# Patient Record
Sex: MALE | Race: BLACK OR AFRICAN AMERICAN | NOT HISPANIC OR LATINO | Employment: OTHER | URBAN - NONMETROPOLITAN AREA
[De-identification: names, ages, dates, MRNs, and addresses within clinical notes are randomized per-mention and may not be internally consistent; named-entity substitution may affect disease eponyms.]

---

## 2018-02-15 ENCOUNTER — TELEPHONE (OUTPATIENT)
Dept: SURGERY | Facility: CLINIC | Age: 58
End: 2018-02-15

## 2018-02-26 ENCOUNTER — OFFICE VISIT (OUTPATIENT)
Dept: SURGERY | Facility: CLINIC | Age: 58
End: 2018-02-26

## 2018-02-26 VITALS
RESPIRATION RATE: 16 BRPM | BODY MASS INDEX: 27.85 KG/M2 | WEIGHT: 224 LBS | DIASTOLIC BLOOD PRESSURE: 74 MMHG | HEIGHT: 75 IN | HEART RATE: 72 BPM | SYSTOLIC BLOOD PRESSURE: 118 MMHG

## 2018-02-26 DIAGNOSIS — Z80.0 FAMILY HISTORY OF COLON CANCER: ICD-10-CM

## 2018-02-26 DIAGNOSIS — R19.7 DIARRHEA, UNSPECIFIED TYPE: Primary | ICD-10-CM

## 2018-02-26 DIAGNOSIS — Z12.11 COLON CANCER SCREENING: ICD-10-CM

## 2018-02-26 PROCEDURE — 99203 OFFICE O/P NEW LOW 30 MIN: CPT | Performed by: SURGERY

## 2018-02-26 NOTE — H&P
"Frank Altamirano 57 y.o. male presents to discuss c-scope.  Last c-scope 5 yrs ago, + H/O polyps.       HPI   Above noted and agree.  Frank is here for a colonoscopy.  He has a family history of colon cancer.  His last colonoscopy was negative.  He is doing well.  He is tolerating a regular diet without nausea or vomiting.  He does have some issues with diarrhea.  That is new.  He moves his bowels 3-4 times per day and it is liquid.  He does not see blood when he goes.  He has no abdominal pain.  He has no chest pain.  He is otherwise without complaints.      Review of Systems   All other systems reviewed and are negative.          Past Medical History:   Diagnosis Date   • Diabetes    • GERD (gastroesophageal reflux disease)    • Hyperlipidemia    • Hypertension    • Insomnia    • Osteoarthritis    • Schizophrenia            Past Surgical History:   Procedure Laterality Date   • COLONOSCOPY             Physical Exam   Constitutional: He is oriented to person, place, and time. He appears well-developed and well-nourished.   HENT:   Head: Normocephalic and atraumatic.   Cardiovascular: Normal rate and regular rhythm.    Pulmonary/Chest: Effort normal and breath sounds normal.   Abdominal: Soft. Bowel sounds are normal.   Musculoskeletal: He exhibits no edema.   Neurological: He is alert and oriented to person, place, and time.   Skin: Skin is warm and dry.   Psychiatric: He has a normal mood and affect. His behavior is normal.   Nursing note and vitals reviewed.          /74  Pulse 72  Resp 16  Ht 190.5 cm (75\")  Wt 102 kg (224 lb)  BMI 28 kg/m2        Frank was seen today for colonoscopy.    Diagnoses and all orders for this visit:    Diarrhea, unspecified type    Family history of colon cancer    Colon cancer screening    We will get Frank scheduled for a colonoscopy.  I discussed with the patient the benefits and risks of performing endoscopy.  Benefits and risks were not limited to but including " bleeding, infection, perforation, complications of anesthesia, aspiration.  The patient appeared to understand and is willing to proceed.    Thank you for allowing me to participate in the care of this interesting patient.

## 2018-03-07 ENCOUNTER — ANESTHESIA EVENT (OUTPATIENT)
Dept: PERIOP | Facility: HOSPITAL | Age: 58
End: 2018-03-07

## 2018-03-08 ENCOUNTER — ANESTHESIA (OUTPATIENT)
Dept: PERIOP | Facility: HOSPITAL | Age: 58
End: 2018-03-08

## 2018-03-08 ENCOUNTER — HOSPITAL ENCOUNTER (OUTPATIENT)
Facility: HOSPITAL | Age: 58
Setting detail: HOSPITAL OUTPATIENT SURGERY
Discharge: HOME | End: 2018-03-08
Attending: SURGERY | Admitting: SURGERY

## 2018-03-08 VITALS
OXYGEN SATURATION: 97 % | DIASTOLIC BLOOD PRESSURE: 97 MMHG | RESPIRATION RATE: 14 BRPM | BODY MASS INDEX: 27.73 KG/M2 | WEIGHT: 223 LBS | SYSTOLIC BLOOD PRESSURE: 139 MMHG | HEIGHT: 75 IN | TEMPERATURE: 98.5 F | HEART RATE: 71 BPM

## 2018-03-08 DIAGNOSIS — R19.7 DIARRHEA, UNSPECIFIED TYPE: ICD-10-CM

## 2018-03-08 DIAGNOSIS — Z80.0 FAMILY HISTORY OF COLON CANCER: ICD-10-CM

## 2018-03-08 DIAGNOSIS — Z12.11 COLON CANCER SCREENING: ICD-10-CM

## 2018-03-08 LAB
GLUCOSE BLDC GLUCOMTR-MCNC: 114 MG/DL (ref 70–130)
GLUCOSE BLDC GLUCOMTR-MCNC: 157 MG/DL (ref 70–130)
POTASSIUM BLD-SCNC: 3.9 MMOL/L (ref 3.5–5.2)

## 2018-03-08 PROCEDURE — 93010 ELECTROCARDIOGRAM REPORT: CPT | Performed by: INTERNAL MEDICINE

## 2018-03-08 PROCEDURE — 82962 GLUCOSE BLOOD TEST: CPT

## 2018-03-08 PROCEDURE — 45380 COLONOSCOPY AND BIOPSY: CPT | Performed by: SURGERY

## 2018-03-08 PROCEDURE — 93005 ELECTROCARDIOGRAM TRACING: CPT | Performed by: NURSE ANESTHETIST, CERTIFIED REGISTERED

## 2018-03-08 PROCEDURE — 84132 ASSAY OF SERUM POTASSIUM: CPT | Performed by: NURSE ANESTHETIST, CERTIFIED REGISTERED

## 2018-03-08 PROCEDURE — 25010000002 PROPOFOL 10 MG/ML EMULSION: Performed by: NURSE ANESTHETIST, CERTIFIED REGISTERED

## 2018-03-08 RX ORDER — SODIUM CHLORIDE, SODIUM LACTATE, POTASSIUM CHLORIDE, CALCIUM CHLORIDE 600; 310; 30; 20 MG/100ML; MG/100ML; MG/100ML; MG/100ML
9 INJECTION, SOLUTION INTRAVENOUS CONTINUOUS
Status: DISCONTINUED | OUTPATIENT
Start: 2018-03-08 | End: 2018-03-08 | Stop reason: HOSPADM

## 2018-03-08 RX ORDER — LIDOCAINE HYDROCHLORIDE 20 MG/ML
INJECTION, SOLUTION INFILTRATION; PERINEURAL AS NEEDED
Status: DISCONTINUED | OUTPATIENT
Start: 2018-03-08 | End: 2018-03-08 | Stop reason: SURG

## 2018-03-08 RX ORDER — LIDOCAINE HYDROCHLORIDE 10 MG/ML
0.5 INJECTION, SOLUTION EPIDURAL; INFILTRATION; INTRACAUDAL; PERINEURAL ONCE AS NEEDED
Status: COMPLETED | OUTPATIENT
Start: 2018-03-08 | End: 2018-03-08

## 2018-03-08 RX ORDER — SODIUM CHLORIDE 9 MG/ML
40 INJECTION, SOLUTION INTRAVENOUS AS NEEDED
Status: DISCONTINUED | OUTPATIENT
Start: 2018-03-08 | End: 2018-03-08 | Stop reason: HOSPADM

## 2018-03-08 RX ORDER — SODIUM CHLORIDE 0.9 % (FLUSH) 0.9 %
1-10 SYRINGE (ML) INJECTION AS NEEDED
Status: DISCONTINUED | OUTPATIENT
Start: 2018-03-08 | End: 2018-03-08 | Stop reason: HOSPADM

## 2018-03-08 RX ORDER — PROPOFOL 10 MG/ML
VIAL (ML) INTRAVENOUS AS NEEDED
Status: DISCONTINUED | OUTPATIENT
Start: 2018-03-08 | End: 2018-03-08 | Stop reason: SURG

## 2018-03-08 RX ORDER — BISACODYL 5 MG/1
5 TABLET, DELAYED RELEASE ORAL DAILY PRN
COMMUNITY

## 2018-03-08 RX ADMIN — LIDOCAINE HYDROCHLORIDE 0.2 ML: 10 INJECTION, SOLUTION EPIDURAL; INFILTRATION; INTRACAUDAL; PERINEURAL at 10:30

## 2018-03-08 RX ADMIN — PROPOFOL 50 MG: 10 INJECTION, EMULSION INTRAVENOUS at 11:45

## 2018-03-08 RX ADMIN — PROPOFOL 50 MG: 10 INJECTION, EMULSION INTRAVENOUS at 11:38

## 2018-03-08 RX ADMIN — LIDOCAINE HYDROCHLORIDE 100 MG: 20 INJECTION, SOLUTION INFILTRATION; PERINEURAL at 11:37

## 2018-03-08 RX ADMIN — PROPOFOL 50 MG: 10 INJECTION, EMULSION INTRAVENOUS at 11:39

## 2018-03-08 RX ADMIN — PROPOFOL 50 MG: 10 INJECTION, EMULSION INTRAVENOUS at 11:41

## 2018-03-08 RX ADMIN — PROPOFOL 50 MG: 10 INJECTION, EMULSION INTRAVENOUS at 11:52

## 2018-03-08 RX ADMIN — SODIUM CHLORIDE, POTASSIUM CHLORIDE, SODIUM LACTATE AND CALCIUM CHLORIDE 9 ML/HR: 600; 310; 30; 20 INJECTION, SOLUTION INTRAVENOUS at 10:30

## 2018-03-08 RX ADMIN — SODIUM CHLORIDE, POTASSIUM CHLORIDE, SODIUM LACTATE AND CALCIUM CHLORIDE: 600; 310; 30; 20 INJECTION, SOLUTION INTRAVENOUS at 11:15

## 2018-03-08 NOTE — PLAN OF CARE
Problem: GI Endoscopy (Adult)  Goal: Signs and Symptoms of Listed Potential Problems Will be Absent or Manageable (GI Endoscopy)  Outcome: Ongoing (interventions implemented as appropriate)   03/08/18 1139   GI Endoscopy   Problems Assessed (GI Endoscopy) all   Problems Present (GI Endoscopy) none

## 2018-03-08 NOTE — NURSING NOTE
Patient instructions provided to jose Quintero from group home.  Transportation for patient delayed and dining voucher provided for patient and caregiver.  Patient accompanied to dining wolf by this RN and group home caregiver Ingrid.

## 2018-03-08 NOTE — PLAN OF CARE
Problem: Patient Care Overview (Adult)  Goal: Plan of Care Review  Outcome: Ongoing (interventions implemented as appropriate)   03/08/18 1052   Coping/Psychosocial Response Interventions   Plan Of Care Reviewed With patient   Outcome Evaluation   Outcome Summary/Follow up Plan vss. no c/o. ready for procedure.

## 2018-03-08 NOTE — PLAN OF CARE
Problem: GI Endoscopy (Adult)  Goal: Signs and Symptoms of Listed Potential Problems Will be Absent or Manageable (GI Endoscopy)  Outcome: Ongoing (interventions implemented as appropriate)   03/08/18 1053   GI Endoscopy   Problems Assessed (GI Endoscopy) all      03/08/18 1053   GI Endoscopy   Problems Assessed (GI Endoscopy) all

## 2018-03-08 NOTE — PLAN OF CARE
Problem: GI Endoscopy (Adult)  Goal: Signs and Symptoms of Listed Potential Problems Will be Absent or Manageable (GI Endoscopy)  Outcome: Outcome(s) achieved Date Met: 03/08/18      Problem: Patient Care Overview (Adult)  Goal: Plan of Care Review  Outcome: Outcome(s) achieved Date Met: 03/08/18    Goal: Adult Individualization and Mutuality  Outcome: Outcome(s) achieved Date Met: 03/08/18

## 2018-03-08 NOTE — ANESTHESIA POSTPROCEDURE EVALUATION
Patient: Frank Altamirano    Procedure Summary     Date Anesthesia Start Anesthesia Stop Room / Location    03/08/18 1135 1203 [facility] / [facility]       Procedure Diagnosis Surgeon Provider    COLONOSCOPY, polypectomy  (N/A ) Family history of colon cancer; Colon cancer screening; Diarrhea, unspecified type  (Family history of colon cancer [Z80.0]; Colon cancer screening [Z12.11]; Diarrhea, unspecified type [R19.7]) DO Janak Markham CRNA          Anesthesia Type: MAC  Last vitals  BP   139/74 (03/08/18 1220)   Temp   98.5 °F (36.9 °C) (03/08/18 1023)   Pulse   64 (03/08/18 1220)   Resp   14 (03/08/18 1220)     SpO2   97 % (03/08/18 1220)     Post Anesthesia Care and Evaluation    Patient participation: complete - patient participated  Level of consciousness: awake and alert  Pain management: adequate  Airway patency: patent  Anesthetic complications: No anesthetic complications  PONV Status: none  Cardiovascular status: acceptable  Respiratory status: acceptable  Hydration status: acceptable    Comments: Post op BS = 157

## 2018-03-08 NOTE — ANESTHESIA PREPROCEDURE EVALUATION
Anesthesia Evaluation     Patient summary reviewed   no history of anesthetic complications:  NPO Solid Status: > 8 hours  NPO Liquid Status: > 2 hours           Airway   Mallampati: II  TM distance: >3 FB  Neck ROM: full  No difficulty expected  Dental          Pulmonary - normal exam    breath sounds clear to auscultation  (+) a smoker ( 1/2ppd x 40 years) Current Abstained day of surgery,   Cardiovascular - normal exam  Exercise tolerance: good (4-7 METS)    PT is on anticoagulation therapy  Rhythm: regular  Rate: normal    (+) hypertension, valvular problems/murmurs, hyperlipidemia,       Neuro/Psych  (+) psychiatric history ( paranoid schizophrenia per resident [facility] papers) Schizophrenia and Depression,     GI/Hepatic/Renal/Endo    (+)  GERD,  diabetes mellitus well controlled,     Musculoskeletal     (+) back pain, chronic pain, neck pain, neck stiffness,   Abdominal    Substance History - negative use     OB/GYN negative ob/gyn ROS         Other   (+) arthritis     ROS/Med Hx Other: 0800:  Black coffee and clear broth    Last asa was 3/2/18      Phys Exam Other: Multiple missing teeth top and bottom              Anesthesia Plan    ASA 2     MAC     intravenous induction   Anesthetic plan and risks discussed with patient.  Use of blood products discussed with patient  Consented to blood products.

## 2018-03-08 NOTE — OP NOTE
Colonoscopy Procedure Note      Pre-operative Diagnosis:  Family history of colon cancer [Z80.0]  Colon cancer screening [Z12.11]  Diarrhea, unspecified type [R19.7]    Post-operative Diagnosis: left colon polyp, rectal polyp    Procedure: Colonoscopy with polypectomy with cold forceps     Surgeon: Cecile    Anesthetic: MAC     Estimated Blood Loss:  minimal    Complications:  none    Indications:  See preop diagnosis    Findings/Treatments:   polpys--removed with cold forceps       Scope Withdrawal Time:  > 6 minutes      Recommendations:  Await pathology      Procedure Details     After discussing the benefits and risks of the procedure with the patient, not limited to but including:  Bleeding, infection, perforation, aspiration; informed consent was signed.  The patient was taken into the endoscopy room at [facility] and placed in the left lateral decubitus position.  MAC anesthesia was given with appropriate cardiopulmonary monitoring.  A rectal exam was performed.  Sphincter tone was normal.  The colonoscope was then inserted and carefully advanced to the cecum while visualizing the mucosa.  The cecum was identified by the ileocecal valve and the orifice of the appendix.  Once in the cecum, the scope was withdrawn while evaluating the mucosa and deflating air.  In the left colon a polyp was removed with cold forceps.  The scope was then withdrawn while evaluating the mucosa and deflating air.  In the rectum, another small polyp was removed with cold forceps.  The scope was then removed and Frank was taken to the recovery area in stable condition having tolerated his procedure well.    Yana Huber DO

## 2018-03-14 LAB
LAB AP CASE REPORT: NORMAL
Lab: NORMAL
PATH REPORT.FINAL DX SPEC: NORMAL

## 2018-03-16 ENCOUNTER — OFFICE VISIT (OUTPATIENT)
Dept: SURGERY | Facility: CLINIC | Age: 58
End: 2018-03-16

## 2018-03-16 DIAGNOSIS — K62.1 RECTAL POLYP: Primary | ICD-10-CM

## 2018-03-16 PROCEDURE — 99212 OFFICE O/P EST SF 10 MIN: CPT | Performed by: SURGERY

## 2018-03-16 NOTE — PROGRESS NOTES
Frank Altamirano 57 y.o. male presents for PO FU C-SCOPE.        HPI     Frank is doing well.  He had a hyperplastic rectal polyp and a benign colon polyp.  He is doing well and has no complaints.    Review of Systems        Past Medical History:   Diagnosis Date   • Diabetes    • GERD (gastroesophageal reflux disease)    • Hyperlipidemia    • Hypertension    • Insomnia    • Osteoarthritis    • Schizophrenia            Past Surgical History:   Procedure Laterality Date   • COLONOSCOPY     • COLONOSCOPY N/A 3/8/2018    Procedure: COLONOSCOPY, polypectomy ;  Surgeon: Yana Huber DO;  Location: [facility];  Service:            Physical Exam   Constitutional: He is oriented to person, place, and time. He appears well-developed and well-nourished.   Cardiovascular: Normal rate and regular rhythm.    Pulmonary/Chest: Effort normal and breath sounds normal.   Abdominal: Soft. Bowel sounds are normal.   Musculoskeletal: He exhibits no edema.   Neurological: He is alert and oriented to person, place, and time.   Psychiatric: He has a normal mood and affect. His behavior is normal.   Nursing note reviewed.          There were no vitals taken for this visit.        Frank was seen today for post-op follow-up.    Diagnoses and all orders for this visit:    Rectal polyp    We have added Frank to our 5 year repeat colonoscopy list.  He may return anytime sooner as needed.    Thank you for allowing me to participate in the care of this interesting patient.

## 2023-02-20 ENCOUNTER — TELEPHONE (OUTPATIENT)
Dept: SURGERY | Facility: CLINIC | Age: 63
End: 2023-02-20
Payer: MEDICARE

## 2023-03-27 ENCOUNTER — OFFICE VISIT (OUTPATIENT)
Dept: SURGERY | Facility: CLINIC | Age: 63
End: 2023-03-27
Payer: MEDICARE

## 2023-03-27 VITALS
WEIGHT: 206 LBS | RESPIRATION RATE: 16 BRPM | DIASTOLIC BLOOD PRESSURE: 72 MMHG | SYSTOLIC BLOOD PRESSURE: 112 MMHG | HEART RATE: 94 BPM | HEIGHT: 75 IN | BODY MASS INDEX: 25.61 KG/M2

## 2023-03-27 DIAGNOSIS — Z80.0 FAMILY HISTORY OF COLON CANCER: ICD-10-CM

## 2023-03-27 DIAGNOSIS — Z72.0 TOBACCO USE: ICD-10-CM

## 2023-03-27 DIAGNOSIS — Z86.010 HISTORY OF COLON POLYPS: Primary | ICD-10-CM

## 2023-03-27 PROBLEM — Z12.11 COLON CANCER SCREENING: Status: RESOLVED | Noted: 2018-02-26 | Resolved: 2023-03-27

## 2023-03-27 PROBLEM — R19.7 DIARRHEA: Status: RESOLVED | Noted: 2018-02-26 | Resolved: 2023-03-27

## 2023-03-27 PROCEDURE — S0260 H&P FOR SURGERY: HCPCS | Performed by: SURGERY

## 2023-03-27 PROCEDURE — 1159F MED LIST DOCD IN RCRD: CPT | Performed by: SURGERY

## 2023-03-27 PROCEDURE — 1160F RVW MEDS BY RX/DR IN RCRD: CPT | Performed by: SURGERY

## 2023-03-27 NOTE — PROGRESS NOTES
Frank Altamirano 63 y.o. male presents @ the req of [facility] Klein for eval of c-scope.  + personal H/O colon polyps.   Chief Complaint   Patient presents with   • Colonoscopy             HPI   Frank is here to discuss a 5-year repeat colonoscopy.  He is tolerating a regular diet without nausea or vomiting.  He is moving his bowels without difficulty.  He has lost about 20 pounds in the last visit.  He denies chest pain or shortness of breath.  He has no abdominal pain.  He does smoke cigarettes.  He does not chew tobacco.  He has no complaints.      Review of Systems   All other systems reviewed and are negative.            Current Outpatient Medications:   •  acetaminophen (TYLENOL) 325 MG tablet, Take 650 mg by mouth Every 6 (Six) Hours As Needed for Mild Pain ., Disp: , Rfl:   •  aspirin 81 MG chewable tablet, Chew 81 mg Daily., Disp: , Rfl:   •  benztropine (COGENTIN) 1 MG tablet, Take 1 mg by mouth 2 (Two) Times a Day., Disp: , Rfl:   •  bisacodyl (DULCOLAX) 5 MG EC tablet, Take 5 mg by mouth Daily As Needed for Constipation., Disp: , Rfl:   •  Canagliflozin (INVOKANA) 300 MG tablet, Take 300 mg by mouth., Disp: , Rfl:   •  diclofenac (VOLTAREN) 1 % gel gel, Apply 4 g topically 4 (Four) Times a Day As Needed., Disp: , Rfl:   •  docusate sodium (COLACE) 250 MG capsule, Take 250 mg by mouth Daily., Disp: , Rfl:   •  hydrochlorothiazide (HYDRODIURIL) 25 MG tablet, Take 25 mg by mouth Daily., Disp: , Rfl:   •  ibuprofen (ADVIL,MOTRIN) 600 MG tablet, Take 600 mg by mouth Every 6 (Six) Hours As Needed for Mild Pain ., Disp: , Rfl:   •  Insulin Glargine (BASAGLAR KWIKPEN SC), Inject  under the skin., Disp: , Rfl:   •  linagliptin (TRADJENTA) 5 MG tablet tablet, Take 5 mg by mouth Daily., Disp: , Rfl:   •  lisinopril (PRINIVIL,ZESTRIL) 5 MG tablet, Take 5 mg by mouth Daily., Disp: , Rfl:   •  Loratadine 10 MG capsule, Take  by mouth., Disp: , Rfl:   •  metFORMIN (GLUCOPHAGE) 1000 MG tablet, Take 1,000 mg by mouth  2 (Two) Times a Day With Meals., Disp: , Rfl:   •  metoprolol tartrate (LOPRESSOR) 25 MG tablet, Take 25 mg by mouth 2 (Two) Times a Day., Disp: , Rfl:   •  omeprazole (priLOSEC) 20 MG capsule, Take 20 mg by mouth Daily., Disp: , Rfl:   •  risperiDONE (risperDAL) 4 MG tablet, Take 4 mg by mouth 2 (Two) Times a Day., Disp: , Rfl:   •  sertraline (ZOLOFT) 100 MG tablet, Take 100 mg by mouth Daily., Disp: , Rfl:   •  simvastatin (ZOCOR) 10 MG tablet, Take 10 mg by mouth Every Night., Disp: , Rfl:   •  tamsulosin (FLOMAX) 0.4 MG capsule 24 hr capsule, Take 1 capsule by mouth Every Night., Disp: , Rfl:   •  temazepam (RESTORIL) 30 MG capsule, Take 30 mg by mouth At Night As Needed for Sleep., Disp: , Rfl:   •  traZODone (DESYREL) 100 MG tablet, Take 100 mg by mouth Every Night., Disp: , Rfl:         No Known Allergies        Past Medical History:   Diagnosis Date   • Diabetes (CMS/HCC)    • GERD (gastroesophageal reflux disease)    • Hyperlipidemia    • Hypertension    • Insomnia    • Osteoarthritis    • Schizophrenia (CMS/HCC)            Past Surgical History:   Procedure Laterality Date   • COLONOSCOPY     • COLONOSCOPY N/A 3/8/2018    Procedure: COLONOSCOPY, polypectomy ;  Surgeon: Yana Huber DO;  Location: [facility];  Service:            Social History     Tobacco Use   • Smoking status: Every Day     Packs/day: 0.50     Types: Cigarettes   • Smokeless tobacco: Never   Substance Use Topics   • Alcohol use: No   • Drug use: No           Immunization History   Administered Date(s) Administered   • COVID-19 (PFIZER) PURPLE CAP 12/30/2020, 01/20/2021, 12/09/2021           Physical Exam  Vitals and nursing note reviewed.   Constitutional:       Appearance: Normal appearance.   HENT:      Head: Normocephalic and atraumatic.   Cardiovascular:      Rate and Rhythm: Normal rate and regular rhythm.   Pulmonary:      Effort: Pulmonary effort is normal.      Breath sounds: Normal breath sounds.   Abdominal:       "General: Bowel sounds are normal.      Palpations: Abdomen is soft.   Musculoskeletal:         General: No swelling or tenderness.   Skin:     General: Skin is warm and dry.   Neurological:      General: No focal deficit present.      Mental Status: He is alert and oriented to person, place, and time.   Psychiatric:         Mood and Affect: Mood normal.         Behavior: Behavior normal.         Debilities/Disabilities Identified: None    Emotional Behavior: Appropriate      /72   Pulse 94   Resp 16   Ht 190.5 cm (75\")   Wt 93.4 kg (206 lb)   BMI 25.75 kg/m²         Diagnoses and all orders for this visit:    1. History of colon polyps (Primary)    2. Family history of colon cancer    I discussed with the patient the benefits and risks of performing endoscopy.  Benefits and risks were not limited to but including bleeding, infection, perforation, complications of anesthesia, aspiration.  The patient appeared to understand and is willing to proceed.    Thank you for allowing me to participate in the care of this interesting patient.            "

## 2023-03-27 NOTE — H&P
Frank Altamirano 63 y.o. male presents @ the req of [facility] for eval of c-scope.  + personal H/O colon polyps.   Chief Complaint   Patient presents with   • Colonoscopy             HPI   Frank is here to discuss a 5-year repeat colonoscopy.  He is tolerating a regular diet without nausea or vomiting.  He is moving his bowels without difficulty.  He has lost about 20 pounds in the last visit.  He denies chest pain or shortness of breath.  He has no abdominal pain.  He does smoke cigarettes.  He does not chew tobacco.  He has no complaints.      Review of Systems   All other systems reviewed and are negative.            Current Outpatient Medications:   •  acetaminophen (TYLENOL) 325 MG tablet, Take 650 mg by mouth Every 6 (Six) Hours As Needed for Mild Pain ., Disp: , Rfl:   •  aspirin 81 MG chewable tablet, Chew 81 mg Daily., Disp: , Rfl:   •  benztropine (COGENTIN) 1 MG tablet, Take 1 mg by mouth 2 (Two) Times a Day., Disp: , Rfl:   •  bisacodyl (DULCOLAX) 5 MG EC tablet, Take 5 mg by mouth Daily As Needed for Constipation., Disp: , Rfl:   •  Canagliflozin (INVOKANA) 300 MG tablet, Take 300 mg by mouth., Disp: , Rfl:   •  diclofenac (VOLTAREN) 1 % gel gel, Apply 4 g topically 4 (Four) Times a Day As Needed., Disp: , Rfl:   •  docusate sodium (COLACE) 250 MG capsule, Take 250 mg by mouth Daily., Disp: , Rfl:   •  hydrochlorothiazide (HYDRODIURIL) 25 MG tablet, Take 25 mg by mouth Daily., Disp: , Rfl:   •  ibuprofen (ADVIL,MOTRIN) 600 MG tablet, Take 600 mg by mouth Every 6 (Six) Hours As Needed for Mild Pain ., Disp: , Rfl:   •  Insulin Glargine (BASAGLAR KWIKPEN SC), Inject  under the skin., Disp: , Rfl:   •  linagliptin (TRADJENTA) 5 MG tablet tablet, Take 5 mg by mouth Daily., Disp: , Rfl:   •  lisinopril (PRINIVIL,ZESTRIL) 5 MG tablet, Take 5 mg by mouth Daily., Disp: , Rfl:   •  Loratadine 10 MG capsule, Take  by mouth., Disp: , Rfl:   •  metFORMIN (GLUCOPHAGE) 1000 MG tablet, Take 1,000 mg by mouth  2 (Two) Times a Day With Meals., Disp: , Rfl:   •  metoprolol tartrate (LOPRESSOR) 25 MG tablet, Take 25 mg by mouth 2 (Two) Times a Day., Disp: , Rfl:   •  omeprazole (priLOSEC) 20 MG capsule, Take 20 mg by mouth Daily., Disp: , Rfl:   •  risperiDONE (risperDAL) 4 MG tablet, Take 4 mg by mouth 2 (Two) Times a Day., Disp: , Rfl:   •  sertraline (ZOLOFT) 100 MG tablet, Take 100 mg by mouth Daily., Disp: , Rfl:   •  simvastatin (ZOCOR) 10 MG tablet, Take 10 mg by mouth Every Night., Disp: , Rfl:   •  tamsulosin (FLOMAX) 0.4 MG capsule 24 hr capsule, Take 1 capsule by mouth Every Night., Disp: , Rfl:   •  temazepam (RESTORIL) 30 MG capsule, Take 30 mg by mouth At Night As Needed for Sleep., Disp: , Rfl:   •  traZODone (DESYREL) 100 MG tablet, Take 100 mg by mouth Every Night., Disp: , Rfl:         No Known Allergies        Past Medical History:   Diagnosis Date   • Diabetes (CMS/HCC)    • GERD (gastroesophageal reflux disease)    • Hyperlipidemia    • Hypertension    • Insomnia    • Osteoarthritis    • Schizophrenia (CMS/HCC)            Past Surgical History:   Procedure Laterality Date   • COLONOSCOPY     • COLONOSCOPY N/A 3/8/2018    Procedure: COLONOSCOPY, polypectomy ;  Surgeon: Yana Huber DO;  Location: [facility];  Service:            Social History     Tobacco Use   • Smoking status: Every Day     Packs/day: 0.50     Types: Cigarettes   • Smokeless tobacco: Never   Substance Use Topics   • Alcohol use: No   • Drug use: No           Immunization History   Administered Date(s) Administered   • COVID-19 (PFIZER) PURPLE CAP 12/30/2020, 01/20/2021, 12/09/2021           Physical Exam  Vitals and nursing note reviewed.   Constitutional:       Appearance: Normal appearance.   HENT:      Head: Normocephalic and atraumatic.   Cardiovascular:      Rate and Rhythm: Normal rate and regular rhythm.   Pulmonary:      Effort: Pulmonary effort is normal.      Breath sounds: Normal breath sounds.   Abdominal:       "General: Bowel sounds are normal.      Palpations: Abdomen is soft.   Musculoskeletal:         General: No swelling or tenderness.   Skin:     General: Skin is warm and dry.   Neurological:      General: No focal deficit present.      Mental Status: He is alert and oriented to person, place, and time.   Psychiatric:         Mood and Affect: Mood normal.         Behavior: Behavior normal.         Debilities/Disabilities Identified: None    Emotional Behavior: Appropriate      /72   Pulse 94   Resp 16   Ht 190.5 cm (75\")   Wt 93.4 kg (206 lb)   BMI 25.75 kg/m²         Diagnoses and all orders for this visit:    1. History of colon polyps (Primary)    2. Family history of colon cancer    I discussed with the patient the benefits and risks of performing endoscopy.  Benefits and risks were not limited to but including bleeding, infection, perforation, complications of anesthesia, aspiration.  The patient appeared to understand and is willing to proceed.    Thank you for allowing me to participate in the care of this interesting patient.                "

## 2023-03-27 NOTE — PATIENT INSTRUCTIONS
Exercising to Lose Weight  Getting regular exercise is important for everyone. It is especially important if you are overweight. Being overweight increases your risk of heart disease, stroke, diabetes, high blood pressure, and several types of cancer. Exercising, and reducing the calories you consume, can help you lose weight and improve fitness and health.  Exercise can be moderate or vigorous intensity. To lose weight, most people need to do a certain amount of moderate or vigorous-intensity exercise each week.  How can exercise affect me?  You lose weight when you exercise enough to burn more calories than you eat. Exercise also reduces body fat and builds muscle. The more muscle you have, the more calories you burn. Exercise also:  • Improves mood.  • Reduces stress and tension.  • Improves your overall fitness, flexibility, and endurance.  • Increases bone strength.  Moderate-intensity exercise  Moderate-intensity exercise is any activity that gets you moving enough to burn at least three times more energy (calories) than if you were sitting.  Examples of moderate exercise include:  • Walking a mile in 15 minutes.  • Doing light yard work.  • Biking at an easy pace.  Most people should get at least 150 minutes of moderate-intensity exercise a week to maintain their body weight.  Vigorous-intensity exercise  Vigorous-intensity exercise is any activity that gets you moving enough to burn at least six times more calories than if you were sitting. When you exercise at this intensity, you should be working hard enough that you are not able to carry on a conversation.  Examples of vigorous exercise include:  • Running.  • Playing a team sport, such as football, basketball, and soccer.  • Jumping rope.  Most people should get at least 75 minutes a week of vigorous exercise to maintain their body weight.  What actions can I take to lose weight?  The amount of exercise you need to lose weight depends on:  • Your  age.  • The type of exercise.  • Any health conditions you have.  • Your overall physical ability.  Talk to your health care provider about how much exercise you need and what types of activities are safe for you.  Nutrition    • Make changes to your diet as told by your health care provider or diet and nutrition specialist (dietitian). This may include:  ? Eating fewer calories.  ? Eating more protein.  ? Eating less unhealthy fats.  ? Eating a diet that includes fresh fruits and vegetables, whole grains, low-fat dairy products, and lean protein.  ? Avoiding foods with added fat, salt, and sugar.  • Drink plenty of water while you exercise to prevent dehydration or heat stroke.  Activity  • Choose an activity that you enjoy and set realistic goals. Your health care provider can help you make an exercise plan that works for you.  • Exercise at a moderate or vigorous intensity most days of the week.  ? The intensity of exercise may vary from person to person. You can tell how intense a workout is for you by paying attention to your breathing and heartbeat. Most people will notice their breathing and heartbeat get faster with more intense exercise.  • Do resistance training twice each week, such as:  ? Push-ups.  ? Sit-ups.  ? Lifting weights.  ? Using resistance bands.  • Getting short amounts of exercise can be just as helpful as long, structured periods of exercise. If you have trouble finding time to exercise, try doing these things as part of your daily routine:  ? Get up, stretch, and walk around every 30 minutes throughout the day.  ? Go for a walk during your lunch break.  ? Park your car farther away from your destination.  ? If you take public transportation, get off one stop early and walk the rest of the way.  ? Make phone calls while standing up and walking around.  ? Take the stairs instead of elevators or escalators.  • Wear comfortable clothes and shoes with good support.  • Do not exercise so much  that you hurt yourself, feel dizzy, or get very short of breath.  Where to find more information  • [place] Department of Health and Human Services: www.hhs.gov  • Centers for Disease Control and Prevention: www.cdc.gov  Contact a health care provider:  • Before starting a new exercise program.  • If you have questions or concerns about your weight.  • If you have a medical problem that keeps you from exercising.  Get help right away if:  • You have any of the following while exercising:  ? Injury.  ? Dizziness.  ? Difficulty breathing or shortness of breath that does not go away when you stop exercising.  ? Chest pain.  ? Rapid heartbeat.  These symptoms may represent a serious problem that is an emergency. Do not wait to see if the symptoms will go away. Get medical help right away. Call your local emergency services (911 in the [place] Do not drive yourself to the hospital.  Summary  • Getting regular exercise is especially important if you are overweight.  • Being overweight increases your risk of heart disease, stroke, diabetes, high blood pressure, and several types of cancer.  • Losing weight happens when you burn more calories than you eat.  • Reducing the amount of calories you eat, and getting regular moderate or vigorous exercise each week, helps you lose weight.  This information is not intended to replace advice given to you by your health care provider. Make sure you discuss any questions you have with your health care provider.  Document Revised: 02/13/2022 Document Reviewed: 02/13/2022  [employer] Patient Education © 2022 [employer]    IF YOU SMOKE OR USE TOBACCO PLEASE READ THE FOLLOWING:  Why is smoking bad for me?  Smoking increases the risk of heart disease, lung disease, vascular disease, stroke, and cancer. If you smoke, STOP!    For more information:  Quit Now [facility]  1-800-QUIT-NOW  https://[place].quitlogix.org/en-[place]/    Steps to Quit Smoking  Smoking tobacco is the leading cause of  preventable death. It can affect almost every organ in the body. Smoking puts you and those around you at risk for developing many serious chronic diseases. Quitting smoking can be difficult, but it is one of the best things that you can do for your health. It is never too late to quit.  How do I get ready to quit?  When you decide to quit smoking, create a plan to help you succeed. Before you quit:  • Pick a date to quit. Set a date within the next 2 weeks to give you time to prepare.  • Write down the reasons why you are quitting. Keep this list in places where you will see it often.  • Tell your family, friends, and co-workers that you are quitting. Support from your loved ones can make quitting easier.  • Talk with your health care provider about your options for quitting smoking.  • Find out what treatment options are covered by your health insurance.  • Identify people, places, things, and activities that make you want to smoke (triggers). Avoid them.  What first steps can I take to quit smoking?  • Throw away all cigarettes at home, at work, and in your car.  • Throw away smoking accessories, such as ashtrays and lighters.  • Clean your car. Make sure to empty the ashtray.  • Clean your home, including curtains and carpets.  What strategies can I use to quit smoking?  Talk with your health care provider about combining strategies, such as taking medicines while you are also receiving in-person counseling. Using these two strategies together makes you more likely to succeed in quitting than if you used either strategy on its own.  • If you are pregnant or breastfeeding, talk with your health care provider about finding counseling or other support strategies to quit smoking. Do not take medicine to help you quit smoking unless your health care provider tells you to do so.  To quit smoking:  Quit right away  • Quit smoking completely, instead of gradually reducing how much you smoke over a period of time.  Research shows that stopping smoking right away is more successful than gradually quitting.  • Attend in-person counseling to help you build problem-solving skills. You are more likely to succeed in quitting if you attend counseling sessions regularly. Even short sessions of 10 minutes can be effective.  Take medicine  You may take medicines to help you quit smoking. Some medicines require a prescription and some you can purchase over-the-counter. Medicines may have nicotine in them to replace the nicotine in cigarettes. Medicines may:  • Help to stop cravings.  • Help to relieve withdrawal symptoms.  Your health care provider may recommend:  • Nicotine patches, gum, or lozenges.  • Nicotine inhalers or sprays.  • Non-nicotine medicine that is taken by mouth.  Find resources  Find resources and support systems that can help you to quit smoking and remain smoke-free after you quit. These resources are most helpful when you use them often. They include:  • Online chats with a counselor.  • Telephone quitlines.  • Printed self-help materials.  • Support groups or group counseling.  • Text messaging programs.  • Mobile phone apps or applications. Use apps that can help you stick to your quit plan by providing reminders, tips, and encouragement. There are many free apps for mobile devices as well as websites. Examples include Quit Guide from the CDC and smokefree.gov  What things can I do to make it easier to quit?    • Reach out to your family and friends for support and encouragement. Call telephone quitlines (-800-QUIT-NOW), reach out to support groups, or work with a counselor for support.  • Ask people who smoke to avoid smoking around you.  • Avoid places that trigger you to smoke, such as bars, parties, or smoke-break areas at work.  • Spend time with people who do not smoke.  • Lessen the stress in your life. Stress can be a smoking trigger for some people. To lessen stress, try:  ? Exercising  regularly.  ? Doing deep-breathing exercises.  ? Doing yoga.  ? Meditating.  ? Performing a body scan. This involves closing your eyes, scanning your body from head to toe, and noticing which parts of your body are particularly tense. Try to relax the muscles in those areas.  How will I feel when I quit smoking?  Day 1 to 3 weeks  Within the first 24 hours of quitting smoking, you may start to feel withdrawal symptoms. These symptoms are usually most noticeable 2-3 days after quitting, but they usually do not last for more than 2-3 weeks. You may experience these symptoms:  • Mood swings.  • Restlessness, anxiety, or irritability.  • Trouble concentrating.  • Dizziness.  • Strong cravings for sugary foods and nicotine.  • Mild weight gain.  • Constipation.  • Nausea.  • Coughing or a sore throat.  • Changes in how the medicines that you take for unrelated issues work in your body.  • Depression.  • Trouble sleeping (insomnia).  Week 3 and afterward  After the first 2-3 weeks of quitting, you may start to notice more positive results, such as:  • Improved sense of smell and taste.  • Decreased coughing and sore throat.  • Slower heart rate.  • Lower blood pressure.  • Clearer skin.  • The ability to breathe more easily.  • Fewer sick days.  Quitting smoking can be very challenging. Do not get discouraged if you are not successful the first time. Some people need to make many attempts to quit before they achieve long-term success. Do your best to stick to your quit plan, and talk with your health care provider if you have any questions or concerns.  Summary  • Smoking tobacco is the leading cause of preventable death. Quitting smoking is one of the best things that you can do for your health.  • When you decide to quit smoking, create a plan to help you succeed.  • Quit smoking right away, not slowly over a period of time.  • When you start quitting, seek help from your health care provider, family, or friends.  This  information is not intended to replace advice given to you by your health care provider. Make sure you discuss any questions you have with your health care provider.  Document Revised: 08/26/2022 Document Reviewed: 03/07/2020  [employer] Patient Education © 2022 [employer]

## 2023-04-17 ENCOUNTER — ANESTHESIA EVENT (OUTPATIENT)
Dept: PERIOP | Facility: HOSPITAL | Age: 63
End: 2023-04-17
Payer: MEDICARE

## 2023-04-18 ENCOUNTER — ANESTHESIA (OUTPATIENT)
Dept: PERIOP | Facility: HOSPITAL | Age: 63
End: 2023-04-18
Payer: MEDICARE

## 2023-04-18 ENCOUNTER — HOSPITAL ENCOUNTER (OUTPATIENT)
Facility: HOSPITAL | Age: 63
Setting detail: HOSPITAL OUTPATIENT SURGERY
Discharge: HOME | End: 2023-04-18
Attending: SURGERY | Admitting: SURGERY
Payer: MEDICARE

## 2023-04-18 VITALS
DIASTOLIC BLOOD PRESSURE: 96 MMHG | TEMPERATURE: 97.6 F | HEIGHT: 75 IN | BODY MASS INDEX: 24 KG/M2 | WEIGHT: 193 LBS | HEART RATE: 99 BPM | RESPIRATION RATE: 20 BRPM | SYSTOLIC BLOOD PRESSURE: 156 MMHG | OXYGEN SATURATION: 98 %

## 2023-04-18 LAB — GLUCOSE BLDC GLUCOMTR-MCNC: 103 MG/DL (ref 70–130)

## 2023-04-18 PROCEDURE — 82962 GLUCOSE BLOOD TEST: CPT

## 2023-04-18 PROCEDURE — 25010000002 PROPOFOL 200 MG/20ML EMULSION: Performed by: NURSE ANESTHETIST, CERTIFIED REGISTERED

## 2023-04-18 RX ORDER — ONDANSETRON 2 MG/ML
4 INJECTION INTRAMUSCULAR; INTRAVENOUS ONCE AS NEEDED
Status: DISCONTINUED | OUTPATIENT
Start: 2023-04-18 | End: 2023-04-18 | Stop reason: HOSPADM

## 2023-04-18 RX ORDER — SODIUM CHLORIDE, SODIUM LACTATE, POTASSIUM CHLORIDE, CALCIUM CHLORIDE 600; 310; 30; 20 MG/100ML; MG/100ML; MG/100ML; MG/100ML
100 INJECTION, SOLUTION INTRAVENOUS CONTINUOUS
Status: DISCONTINUED | OUTPATIENT
Start: 2023-04-18 | End: 2023-04-18 | Stop reason: HOSPADM

## 2023-04-18 RX ORDER — LOPERAMIDE HYDROCHLORIDE 2 MG/1
2 CAPSULE ORAL 4 TIMES DAILY PRN
COMMUNITY

## 2023-04-18 RX ORDER — SODIUM CHLORIDE 0.9 % (FLUSH) 0.9 %
10 SYRINGE (ML) INJECTION EVERY 12 HOURS SCHEDULED
Status: DISCONTINUED | OUTPATIENT
Start: 2023-04-18 | End: 2023-04-18 | Stop reason: HOSPADM

## 2023-04-18 RX ORDER — SODIUM CHLORIDE 9 MG/ML
40 INJECTION, SOLUTION INTRAVENOUS AS NEEDED
Status: DISCONTINUED | OUTPATIENT
Start: 2023-04-18 | End: 2023-04-18 | Stop reason: HOSPADM

## 2023-04-18 RX ORDER — LIDOCAINE HYDROCHLORIDE 10 MG/ML
0.5 INJECTION, SOLUTION EPIDURAL; INFILTRATION; INTRACAUDAL; PERINEURAL ONCE AS NEEDED
Status: DISCONTINUED | OUTPATIENT
Start: 2023-04-18 | End: 2023-04-18 | Stop reason: HOSPADM

## 2023-04-18 RX ORDER — LIDOCAINE HYDROCHLORIDE 20 MG/ML
INJECTION, SOLUTION INFILTRATION; PERINEURAL AS NEEDED
Status: DISCONTINUED | OUTPATIENT
Start: 2023-04-18 | End: 2023-04-18 | Stop reason: SURG

## 2023-04-18 RX ORDER — SODIUM CHLORIDE 0.9 % (FLUSH) 0.9 %
10 SYRINGE (ML) INJECTION AS NEEDED
Status: DISCONTINUED | OUTPATIENT
Start: 2023-04-18 | End: 2023-04-18 | Stop reason: HOSPADM

## 2023-04-18 RX ORDER — MEPERIDINE HYDROCHLORIDE 25 MG/ML
12.5 INJECTION INTRAMUSCULAR; INTRAVENOUS; SUBCUTANEOUS
Status: DISCONTINUED | OUTPATIENT
Start: 2023-04-18 | End: 2023-04-18 | Stop reason: HOSPADM

## 2023-04-18 RX ORDER — SODIUM CHLORIDE, SODIUM LACTATE, POTASSIUM CHLORIDE, CALCIUM CHLORIDE 600; 310; 30; 20 MG/100ML; MG/100ML; MG/100ML; MG/100ML
9 INJECTION, SOLUTION INTRAVENOUS CONTINUOUS
Status: DISCONTINUED | OUTPATIENT
Start: 2023-04-18 | End: 2023-04-18 | Stop reason: HOSPADM

## 2023-04-18 RX ORDER — PROPOFOL 10 MG/ML
INJECTION, EMULSION INTRAVENOUS AS NEEDED
Status: DISCONTINUED | OUTPATIENT
Start: 2023-04-18 | End: 2023-04-18 | Stop reason: SURG

## 2023-04-18 RX ADMIN — PROPOFOL 50 MG: 10 INJECTION, EMULSION INTRAVENOUS at 10:29

## 2023-04-18 RX ADMIN — PROPOFOL 50 MG: 10 INJECTION, EMULSION INTRAVENOUS at 10:32

## 2023-04-18 RX ADMIN — PROPOFOL 40 MG: 10 INJECTION, EMULSION INTRAVENOUS at 10:43

## 2023-04-18 RX ADMIN — PROPOFOL 50 MG: 10 INJECTION, EMULSION INTRAVENOUS at 10:40

## 2023-04-18 RX ADMIN — SODIUM CHLORIDE, POTASSIUM CHLORIDE, SODIUM LACTATE AND CALCIUM CHLORIDE 9 ML/HR: 600; 310; 30; 20 INJECTION, SOLUTION INTRAVENOUS at 09:47

## 2023-04-18 RX ADMIN — LIDOCAINE HYDROCHLORIDE 100 MG: 20 INJECTION, SOLUTION INFILTRATION; PERINEURAL at 10:29

## 2023-04-18 RX ADMIN — PROPOFOL 50 MG: 10 INJECTION, EMULSION INTRAVENOUS at 10:36

## 2023-04-18 NOTE — OP NOTE
Colonoscopy Procedure Note      Pre-operative Diagnosis: History of colon polyps    Post-operative Diagnosis: Normal colon    Procedure: Colonoscopy       Surgeon: Cecile    Anesthetic: MAC Natividad Coates CRNA    Estimated Blood Loss: Minimal    Complications: None    Indications: History of colon polyps    Findings/Treatments:   Normal colon       Scope Withdrawal Time:  6 minutes      Recommendations: 5-year repeat due to history of colon polyps      Procedure Details     After discussing the benefits and risks of the procedure with the patient, not limited to but including:  Bleeding, infection, perforation, aspiration; informed consent was signed.  The patient was taken into the endoscopy room at [facility] and placed in the left lateral decubitus position.  MAC anesthesia was given with appropriate cardiopulmonary monitoring.  A rectal exam was performed.  Sphincter tone was normal.  The colonoscope was then inserted and carefully advanced to the cecum while visualizing the mucosa.  The cecum was identified by the ileocecal valve and the orifice of the appendix.  Once in the cecum the scope was slowly withdrawn while carefully evaluating the mucosa and deflating air.  There were no abnormalities noted.  Once in the rectum the scope was retroflexed and straightened and removed.  Frank was taken to the recovery area in stable postoperative condition having tolerated his procedure well.    Yana Huber DO

## 2023-04-18 NOTE — ANESTHESIA PREPROCEDURE EVALUATION
Anesthesia Evaluation     Patient summary reviewed and Nursing notes reviewed   NPO Solid Status: > 8 hours  NPO Liquid Status: > 8 hours           Airway   Mallampati: I  TM distance: >3 FB  Neck ROM: full  No difficulty expected  Dental    (+) poor dentition        Pulmonary - normal exam   (+) a smoker (last cigarrette in March 2023) Current Abstained day of surgery,   Cardiovascular - normal exam  Exercise tolerance: good (4-7 METS)    ECG reviewed  Patient on routine beta blocker and Beta blocker given within 24 hours of surgery  Rhythm: regular  Rate: normal    (+) hypertension 2 medications or greater, hyperlipidemia,     ROS comment: 3/8/18: EKG    RR Interval= 800 ms  [place] Interval= 164 ms  QRSD Interval= 92 ms  QT Interval= 396 ms  QTc Interval= 443 ms  Heart Rate= 75 ms  P Axis= 45 deg  QRS Axis= 57 deg  T Wave Axis= 49 deg  I: 40 Axis= -2 deg  T: 40 Axis= 75 deg  ST Axis= 63 deg  SINUS RHYTHM  NO PRIOR TRACING AVAILABLE FOR COMPARISON    Neuro/Psych  (+) numbness (feet), psychiatric history Schizophrenia, Anxiety and Depression,    GI/Hepatic/Renal/Endo    (+)  GERD well controlled,  diabetes mellitus type 2 using insulin,     Musculoskeletal     (+) back pain, neck pain,   Abdominal  - normal exam   Substance History - negative use     OB/GYN          Other   arthritis,          Phys Exam Other: Many missing and chipped/broken teeth. Pt states that none are loose at this time.              Anesthesia Plan    ASA 3     MAC     intravenous induction     Anesthetic plan, risks, benefits, and alternatives have been provided, discussed and informed consent has been obtained with: patient.  Pre-procedure education provided  Use of blood products discussed with patient  Consented to blood products.   Plan discussed with CRNA.        CODE STATUS:

## 2023-04-18 NOTE — H&P
Frank Altamirano 63 y.o. male presents @ the req of [facility] for eval of c-scope.  + personal H/O colon polyps.   Chief Complaint   Patient presents with   • Colonoscopy             HPI   Frank is here to discuss a 5-year repeat colonoscopy.  He is tolerating a regular diet without nausea or vomiting.  He is moving his bowels without difficulty.  He has lost about 20 pounds in the last visit.  He denies chest pain or shortness of breath.  He has no abdominal pain.  He does smoke cigarettes.  He does not chew tobacco.  He has no complaints.      Review of Systems   All other systems reviewed and are negative.            Current Outpatient Medications:   •  acetaminophen (TYLENOL) 325 MG tablet, Take 650 mg by mouth Every 6 (Six) Hours As Needed for Mild Pain ., Disp: , Rfl:   •  aspirin 81 MG chewable tablet, Chew 81 mg Daily., Disp: , Rfl:   •  benztropine (COGENTIN) 1 MG tablet, Take 1 mg by mouth 2 (Two) Times a Day., Disp: , Rfl:   •  bisacodyl (DULCOLAX) 5 MG EC tablet, Take 5 mg by mouth Daily As Needed for Constipation., Disp: , Rfl:   •  Canagliflozin (INVOKANA) 300 MG tablet, Take 300 mg by mouth., Disp: , Rfl:   •  diclofenac (VOLTAREN) 1 % gel gel, Apply 4 g topically 4 (Four) Times a Day As Needed., Disp: , Rfl:   •  docusate sodium (COLACE) 250 MG capsule, Take 250 mg by mouth Daily., Disp: , Rfl:   •  hydrochlorothiazide (HYDRODIURIL) 25 MG tablet, Take 25 mg by mouth Daily., Disp: , Rfl:   •  ibuprofen (ADVIL,MOTRIN) 600 MG tablet, Take 600 mg by mouth Every 6 (Six) Hours As Needed for Mild Pain ., Disp: , Rfl:   •  Insulin Glargine (BASAGLAR KWIKPEN SC), Inject  under the skin., Disp: , Rfl:   •  linagliptin (TRADJENTA) 5 MG tablet tablet, Take 5 mg by mouth Daily., Disp: , Rfl:   •  lisinopril (PRINIVIL,ZESTRIL) 5 MG tablet, Take 5 mg by mouth Daily., Disp: , Rfl:   •  Loratadine 10 MG capsule, Take  by mouth., Disp: , Rfl:   •  metFORMIN (GLUCOPHAGE) 1000 MG tablet, Take 1,000 mg by mouth  2 (Two) Times a Day With Meals., Disp: , Rfl:   •  metoprolol tartrate (LOPRESSOR) 25 MG tablet, Take 25 mg by mouth 2 (Two) Times a Day., Disp: , Rfl:   •  omeprazole (priLOSEC) 20 MG capsule, Take 20 mg by mouth Daily., Disp: , Rfl:   •  risperiDONE (risperDAL) 4 MG tablet, Take 4 mg by mouth 2 (Two) Times a Day., Disp: , Rfl:   •  sertraline (ZOLOFT) 100 MG tablet, Take 100 mg by mouth Daily., Disp: , Rfl:   •  simvastatin (ZOCOR) 10 MG tablet, Take 10 mg by mouth Every Night., Disp: , Rfl:   •  tamsulosin (FLOMAX) 0.4 MG capsule 24 hr capsule, Take 1 capsule by mouth Every Night., Disp: , Rfl:   •  temazepam (RESTORIL) 30 MG capsule, Take 30 mg by mouth At Night As Needed for Sleep., Disp: , Rfl:   •  traZODone (DESYREL) 100 MG tablet, Take 100 mg by mouth Every Night., Disp: , Rfl:         No Known Allergies        Past Medical History:   Diagnosis Date   • Diabetes (CMS/HCC)    • GERD (gastroesophageal reflux disease)    • Hyperlipidemia    • Hypertension    • Insomnia    • Osteoarthritis    • Schizophrenia (CMS/HCC)            Past Surgical History:   Procedure Laterality Date   • COLONOSCOPY     • COLONOSCOPY N/A 3/8/2018    Procedure: COLONOSCOPY, polypectomy ;  Surgeon: Yana Huber DO;  Location: [facility];  Service:            Social History     Tobacco Use   • Smoking status: Every Day     Packs/day: 0.50     Types: Cigarettes   • Smokeless tobacco: Never   Substance Use Topics   • Alcohol use: No   • Drug use: No           Immunization History   Administered Date(s) Administered   • COVID-19 (PFIZER) PURPLE CAP 12/30/2020, 01/20/2021, 12/09/2021           Physical Exam  Vitals and nursing note reviewed.   Constitutional:       Appearance: Normal appearance.   HENT:      Head: Normocephalic and atraumatic.   Cardiovascular:      Rate and Rhythm: Normal rate and regular rhythm.   Pulmonary:      Effort: Pulmonary effort is normal.      Breath sounds: Normal breath sounds.   Abdominal:       "General: Bowel sounds are normal.      Palpations: Abdomen is soft.   Musculoskeletal:         General: No swelling or tenderness.   Skin:     General: Skin is warm and dry.   Neurological:      General: No focal deficit present.      Mental Status: He is alert and oriented to person, place, and time.   Psychiatric:         Mood and Affect: Mood normal.         Behavior: Behavior normal.         Debilities/Disabilities Identified: None    Emotional Behavior: Appropriate      /72   Pulse 94   Resp 16   Ht 190.5 cm (75\")   Wt 93.4 kg (206 lb)   BMI 25.75 kg/m²         Diagnoses and all orders for this visit:    1. History of colon polyps (Primary)    2. Family history of colon cancer    I discussed with the patient the benefits and risks of performing endoscopy.  Benefits and risks were not limited to but including bleeding, infection, perforation, complications of anesthesia, aspiration.  The patient appeared to understand and is willing to proceed.    Thank you for allowing me to participate in the care of this interesting patient.                "

## 2023-04-18 NOTE — ANESTHESIA POSTPROCEDURE EVALUATION
Patient: Frank lAtamirano    Procedure Summary     Date: 04/18/23 Room / Location: [facility] ENDOSCOPY 1 /  [facility]    Anesthesia Start: 1026 Anesthesia Stop: 1046    Procedure: Colonoscopy Diagnosis:       History of colon polyps      Family history of colon cancer      (History of colon polyps [Z86.010])      (Family history of colon cancer [Z80.0])    Surgeons: Yana Huber DO Provider: Natividad Orr CRNA    Anesthesia Type: MAC ASA Status: 3          Anesthesia Type: MAC    Vitals  Vitals Value Taken Time   /81 04/18/23 1051   Temp 97.6 °F (36.4 °C) 04/18/23 1051   Pulse 92 04/18/23 1051   Resp 20 04/18/23 1051   SpO2 100 % 04/18/23 1051           Post Anesthesia Care and Evaluation    Patient location during evaluation: PHASE II  Patient participation: complete - patient participated  Level of consciousness: awake and alert  Pain score: 0  Pain management: satisfactory to patient    Airway patency: patent  Anesthetic complications: No anesthetic complications  PONV Status: none  Cardiovascular status: acceptable  Respiratory status: acceptable  Hydration status: acceptable

## (undated) DEVICE — SPNG GZ WOVN 4X4IN 12PLY 10/BX STRL

## (undated) DEVICE — VIAL FORMALIN CAP 10P 40ML

## (undated) DEVICE — ADAPT CLN BIOGUARD AIR/H2O DISP

## (undated) DEVICE — FRCP BX RADJAW4 NDL 2.8 240CM LG OG BX40

## (undated) DEVICE — BW-412T DISP COMBO CLEANING BRUSH: Brand: SINGLE USE COMBINATION CLEANING BRUSH

## (undated) DEVICE — Device

## (undated) DEVICE — SYR LL 3CC

## (undated) DEVICE — SYR LUER SLPTP 50ML

## (undated) DEVICE — KT ORCA ORCAPOD DISP STRL

## (undated) DEVICE — SAFELINER SUCTION CANISTER 1000CC: Brand: DEROYAL

## (undated) DEVICE — MASK,FACE,FLUID RESIST,SHLD,EARLOOP: Brand: MEDLINE

## (undated) DEVICE — Device: Brand: DEFENDO AIR/WATER/SUCTION AND BIOPSY VALVE

## (undated) DEVICE — GOWN ISOL W/THUMB UNIV BLU BX/15

## (undated) DEVICE — SUCTION CANISTER, 3000CC,SAFELINER: Brand: DEROYAL

## (undated) DEVICE — SOL IRR H2O BTL 1000ML STRL